# Patient Record
Sex: FEMALE | Race: WHITE | NOT HISPANIC OR LATINO | Employment: OTHER | URBAN - METROPOLITAN AREA
[De-identification: names, ages, dates, MRNs, and addresses within clinical notes are randomized per-mention and may not be internally consistent; named-entity substitution may affect disease eponyms.]

---

## 2018-01-08 ENCOUNTER — HISTORICAL (OUTPATIENT)
Dept: ADMINISTRATIVE | Facility: HOSPITAL | Age: 68
End: 2018-01-08

## 2018-01-31 ENCOUNTER — HISTORICAL (OUTPATIENT)
Dept: INTENSIVE CARE | Facility: HOSPITAL | Age: 68
End: 2018-01-31

## 2018-02-21 ENCOUNTER — HISTORICAL (OUTPATIENT)
Dept: RADIOLOGY | Facility: HOSPITAL | Age: 68
End: 2018-02-21

## 2018-02-22 ENCOUNTER — HISTORICAL (OUTPATIENT)
Dept: INTENSIVE CARE | Facility: HOSPITAL | Age: 68
End: 2018-02-22

## 2018-03-26 ENCOUNTER — HISTORICAL (OUTPATIENT)
Dept: ADMINISTRATIVE | Facility: HOSPITAL | Age: 68
End: 2018-03-26

## 2018-04-06 ENCOUNTER — HISTORICAL (OUTPATIENT)
Dept: CARDIOLOGY | Facility: HOSPITAL | Age: 68
End: 2018-04-06

## 2018-09-24 ENCOUNTER — HISTORICAL (OUTPATIENT)
Dept: ADMINISTRATIVE | Facility: HOSPITAL | Age: 68
End: 2018-09-24

## 2018-12-13 ENCOUNTER — HISTORICAL (OUTPATIENT)
Dept: RADIOLOGY | Facility: HOSPITAL | Age: 68
End: 2018-12-13

## 2018-12-13 LAB — POC CREATININE: 0.7 MG/DL (ref 0.6–1.3)

## 2019-03-04 ENCOUNTER — HISTORICAL (OUTPATIENT)
Dept: ADMINISTRATIVE | Facility: HOSPITAL | Age: 69
End: 2019-03-04

## 2019-04-10 ENCOUNTER — HISTORICAL (OUTPATIENT)
Dept: ENDOSCOPY | Facility: HOSPITAL | Age: 69
End: 2019-04-10

## 2020-01-06 ENCOUNTER — HISTORICAL (OUTPATIENT)
Dept: ADMINISTRATIVE | Facility: HOSPITAL | Age: 70
End: 2020-01-06

## 2020-01-14 ENCOUNTER — HISTORICAL (OUTPATIENT)
Dept: SURGERY | Facility: HOSPITAL | Age: 70
End: 2020-01-14

## 2020-02-12 ENCOUNTER — HISTORICAL (OUTPATIENT)
Dept: LAB | Facility: HOSPITAL | Age: 70
End: 2020-02-12

## 2020-09-30 ENCOUNTER — HISTORICAL (OUTPATIENT)
Dept: CARDIOLOGY | Facility: HOSPITAL | Age: 70
End: 2020-09-30

## 2022-02-21 ENCOUNTER — HISTORICAL (OUTPATIENT)
Dept: LAB | Facility: HOSPITAL | Age: 72
End: 2022-02-21

## 2022-02-21 LAB
ALBUMIN SERPL-MCNC: 4.2 G/DL (ref 3.4–4.8)
ALBUMIN/GLOB SERPL: 1.6 {RATIO} (ref 1.1–2)
ALP SERPL-CCNC: 73 U/L (ref 40–150)
ALT SERPL-CCNC: 21 U/L (ref 0–55)
AST SERPL-CCNC: 24 U/L (ref 5–34)
BILIRUB SERPL-MCNC: 1.3 MG/DL
BILIRUBIN DIRECT+TOT PNL SERPL-MCNC: 0.4 (ref 0–0.5)
BILIRUBIN DIRECT+TOT PNL SERPL-MCNC: 0.9 (ref 0–0.8)
BUN SERPL-MCNC: 19 MG/DL (ref 9.8–20.1)
CALCIUM SERPL-MCNC: 9.7 MG/DL (ref 8.7–10.5)
CHLORIDE SERPL-SCNC: 102 MMOL/L (ref 98–107)
CO2 SERPL-SCNC: 30 MMOL/L (ref 23–31)
CREAT SERPL-MCNC: 0.79 MG/DL (ref 0.55–1.02)
EST. AVERAGE GLUCOSE BLD GHB EST-MCNC: 111.2 MG/DL
GLOBULIN SER-MCNC: 2.6 G/DL (ref 2.4–3.5)
GLUCOSE SERPL-MCNC: 92 MG/DL (ref 82–115)
HBA1C MFR BLD: 5.5 %
HEMOLYSIS INTERF INDEX SERPL-ACNC: 33
ICTERIC INTERF INDEX SERPL-ACNC: 1
LIPEMIC INTERF INDEX SERPL-ACNC: 5
POTASSIUM SERPL-SCNC: 4.5 MMOL/L (ref 3.5–5.1)
PROT SERPL-MCNC: 6.8 G/DL (ref 5.8–7.6)
SODIUM SERPL-SCNC: 140 MMOL/L (ref 136–145)

## 2022-04-07 ENCOUNTER — HISTORICAL (OUTPATIENT)
Dept: ADMINISTRATIVE | Facility: HOSPITAL | Age: 72
End: 2022-04-07

## 2022-04-23 VITALS
WEIGHT: 197.56 LBS | DIASTOLIC BLOOD PRESSURE: 86 MMHG | HEIGHT: 62 IN | BODY MASS INDEX: 36.35 KG/M2 | SYSTOLIC BLOOD PRESSURE: 160 MMHG

## 2022-04-28 NOTE — H&P
HISTORY OF PRESENT ILLNESS:  Ms. Owsuu is a pleasant 68-year-old known to me from previous evaluation.  In 2014, colonoscopy found hemorrhoidal disease, a tortuous sigmoid with diverticula, and adenomatous polyps, prompting us to propose a repeat exam in 5 years' time.  She is today scheduled for that.     She did have some left upper quadrant and epigastric issues a couple of years back.  2016, upper examination found significant erosive antral gastritis.  She had been on nonsteroidals and aspirin.  She had a CT of the abdomen and pelvis then detailing steatosis, her post cholecystectomy and post hysterectomy state, a left parapelvic renal cyst, some atherosclerosis, diverticulosis, and degenerative joint disease.     She has done well in the interim, is maintained on omeprazole and rarely takes nonsteroidals.  She is on a daily aspirin because of a family history of cardiac concerns.  She does have osteopenia, treated with vitamin D and calcium.    PAST MEDICAL HISTORY:  Remarkable for the osteopenia, hypertension, left total knee replacement, cholecystectomy, abdominal hysterectomy, , appendectomy, blepharoplasty, tonsillectomy, exploratory laparoscopy, and breast reduction.    SOCIAL HISTORY:  She does not smoke, really does not drink.  She is  with a significant other.    FAMILY HISTORY:  She has a brother with ulcerative colitis.  No family history for colonic neoplasia.    PHYSICAL EXAMINATION:  VITAL SIGNS:  Finds her afebrile with stable vital signs.   HEART:  Regular in rate and rhythm.   LUNGS:  Clear to auscultation.   ABDOMEN:  Soft and nontender without organomegaly or mass.   NEUROLOGIC:  Alert and oriented.  Motor grossly intact.      DISCUSSION:  A 68-year-old scheduled for surveillance colonoscopy, with a personal history of adenomatous colon polyps.  The patient's orthopedist proposed that she ought to have penicillin 2 g 1 hour before today's procedure  and 1 g 6 hours after, and she has undertaken that.        ______________________________  Danilo Mcnair MD    JCB/UT  DD:  04/10/2019  Time:  08:46AM  DT:  04/10/2019  Time:  09:02AM  Job #:  [id number]    The H&P was reviewed, the patient was examined, and the following changes to the patients condition are noted:  ______________________________________________________________________________  ______________________________________________________________________________  ______________________________________________________________________________  [  ] No changes to the patient's condition:      ______________________________                                             ___________________  PHYSICIAN SIGNATURE                                                             DATE/TIME    cc: MD Harshal Carreno MD Stephen R. Salopek, MD

## 2022-04-28 NOTE — OP NOTE
DATE OF SURGERY:    04/10/2019    SURGEON:  Danilo Mcnair MD    PROCEDURE:  Colonoscopy, terminal ileoscopy, snare and biopsy polypectomy.    PREOPERATIVE DIAGNOSIS:  Personal history of adenomatous colon polyps.    POSTOPERATIVE DIAGNOSES:    1. The patient received antibiotic prophylaxis with penicillin V 500 mg 2 g before and 1 g post procedure.  2. Adequate sedation with Diprivan per Anesthesia.  3. Good colon prep, left-sided diverticulosis; otherwise, straightforward exam to the cecum and a normal-appearing terminal ileum.  4. 5 mm polyp at 20 cm in the sigmoid colon, snare, histopathology pending.  5. 4 mm polyp in the transverse colon, cold snare, histopathology pending (specimen may be lost).  6. 4 mm polyp at the hepatic flexure, cold snare, histopathology pending (specimen may be lost).  7. Two 3 mm polyps at the splenic flexure addressed with a cold forceps and submitted together.  8. Otherwise benign exam to the cecum.  Erythematous ileocecal valve, but normal-appearing terminal ileum.  Appendiceal orifice noted.    PROCEDURE IN DETAIL:  The patient consented for the procedure after a detailed explanation of the risks and complications including bleeding, perforation and adverse reaction to sedation.  The patient was placed in the left lateral decubitus position.  Initially we examined the perineum and performed a digital exam.     The video colonoscope was inserted in the rectal vault and passed under direct vision.  We retroflexed to allow visualization of the anal verge and then de-retroflexed, traversing the sigmoid colon, descending colon and splenic flexure, transverse colon, hepatic flexure, ascending colon and entering the cecum.  The cecum was identified by visualizing the appendiceal orifice and the ileocecal valve.  The scope was withdrawn with reexamination of the mucosa and with biopsies, polypectomies and specimens removed as outlined below.     The scope was removed in its  entirety and the patient tolerated the procedure well.     The patient's findings are as detailed.  She did have some left-sided diverticulosis.  She had polyps noted and addressed as detailed, 1 at 20 cm in the sigmoid colon, another in the transverse colon, 1 at the hepatic flexure, and 2 small polyps at the splenic flexure.  We did see through to the cecum.  We saw an erythematous ileocecal valve and a normal-appearing terminal ileum.  Slow withdrawal found no other discrete mucosal abnormalities.    DISCUSSION AND DISCHARGE SUMMARY:  After procedure was completed, we discussed our findings with the patient and her attendant.  We examined her and found her stable for discharge.  We allowed that she could resume usual diet today and activities tomorrow.  She is to call if she has not heard about her biopsies within the week.  Given the number of polyps noted at today's exam, current recommendations would suggest a repeat examination in 3 years' time.  She should have her siblings screened.        ______________________________  MD KARTHIKEYAN Valentin/SH  DD:  04/10/2019  Time:  09:16AM  DT:  04/10/2019  Time:  09:32AM  Job #:  [id number]    cc: MD Harshal Carreno MD James C. Bienvenu, MD Stephen R. Salopek, MD

## 2022-04-28 NOTE — OP NOTE
DATE OF SURGERY:    01/14/2020    SURGEON:  Harshal Reyes MD    PREOPERATIVE DIAGNOSIS:  Right middle finger trigger finger.    POSTOPERATIVE DIAGNOSIS:  Right middle finger trigger finger.    PROCEDURE:  Trigger finger release of right middle finger.    COUNTS:  Lap, needle, and sponge counts correct.    COMPLICATIONS:  None.    ESTIMATED BLOOD LOSS:  None.    INDICATIONS FOR PROCEDURE:  Ms. Owusu is 69 years old with a trigger finger of her right middle finger at the A-1 pulley.  She elected to undergo trigger finger release of the right middle finger.  Risks, benefits, alternatives, and complications of operative and nonoperative treatment were explained.  She understood, agreed, and wanted to proceed with operative intervention.  Valid consent was obtained.    DESCRIPTION OF PROCEDURE:  She was brought to the operating room, placed supine on the operating room table and underwent anesthesia.  The tourniquet was placed on the right arm.  The usual sterile ChloraPrep scrub and paint followed by sterile draping was performed.  Ioban dressing was placed.  Esmarch bandage was used to exsanguinate the right upper extremity.  The tourniquet was inflated.  Incision was made in the distal palmar crease at the base of the A-1 pulley of the right middle finger on the palmar surface.  Skin bleeders were coagulated with cautery.  The skin was lifted.  Digital nerve was protected and the A-1 pulley at the flexor tendon sheath was incised to release the triggering.  There was no further triggering.  The tourniquet was deflated.  Hemostasis was obtained.  There was no abnormal bleeding.  The wound was irrigated, suctioned, and closed with nylon sutures.  Sterile dressings were applied.  The patient was taken to the recovery room in stable condition.      ______________________________  Harshal Reyes MD    SY/UE  DD:  01/14/2020  Time:  08:20AM  DT:  01/14/2020  Time:  08:26AM  Job #:  [id number]

## 2023-03-23 ENCOUNTER — HOSPITAL ENCOUNTER (OUTPATIENT)
Dept: RADIOLOGY | Facility: CLINIC | Age: 73
Discharge: HOME | End: 2023-03-23
Attending: PHYSICIAN ASSISTANT
Payer: MEDICARE

## 2023-03-23 ENCOUNTER — OFFICE VISIT (OUTPATIENT)
Dept: ORTHOPEDICS | Facility: CLINIC | Age: 73
End: 2023-03-23
Payer: MEDICARE

## 2023-03-23 VITALS — WEIGHT: 196.69 LBS | HEIGHT: 62 IN | BODY MASS INDEX: 36.2 KG/M2

## 2023-03-23 DIAGNOSIS — M79.641 RIGHT HAND PAIN: ICD-10-CM

## 2023-03-23 DIAGNOSIS — M17.11 PRIMARY OSTEOARTHRITIS OF RIGHT KNEE: ICD-10-CM

## 2023-03-23 DIAGNOSIS — M25.561 PAIN IN BOTH KNEES, UNSPECIFIED CHRONICITY: ICD-10-CM

## 2023-03-23 DIAGNOSIS — M25.562 PAIN IN BOTH KNEES, UNSPECIFIED CHRONICITY: Primary | ICD-10-CM

## 2023-03-23 DIAGNOSIS — M25.562 PAIN IN BOTH KNEES, UNSPECIFIED CHRONICITY: ICD-10-CM

## 2023-03-23 DIAGNOSIS — M25.561 PAIN IN BOTH KNEES, UNSPECIFIED CHRONICITY: Primary | ICD-10-CM

## 2023-03-23 DIAGNOSIS — M19.041 ARTHRITIS OF FINGER OF RIGHT HAND: ICD-10-CM

## 2023-03-23 PROCEDURE — 73564 X-RAY EXAM KNEE 4 OR MORE: CPT | Mod: 50,,, | Performed by: PHYSICIAN ASSISTANT

## 2023-03-23 PROCEDURE — 99203 OFFICE O/P NEW LOW 30 MIN: CPT | Mod: ,,, | Performed by: PHYSICIAN ASSISTANT

## 2023-03-23 PROCEDURE — 73564 XR KNEE COMP 4 OR MORE VIEWS BILAT: ICD-10-PCS | Mod: 50,,, | Performed by: PHYSICIAN ASSISTANT

## 2023-03-23 PROCEDURE — 73130 XR HAND COMPLETE 3 VIEW RIGHT: ICD-10-PCS | Mod: RT,,, | Performed by: PHYSICIAN ASSISTANT

## 2023-03-23 PROCEDURE — 73130 X-RAY EXAM OF HAND: CPT | Mod: RT,,, | Performed by: PHYSICIAN ASSISTANT

## 2023-03-23 PROCEDURE — 99203 PR OFFICE/OUTPT VISIT, NEW, LEVL III, 30-44 MIN: ICD-10-PCS | Mod: ,,, | Performed by: PHYSICIAN ASSISTANT

## 2023-03-23 RX ORDER — PAROXETINE HYDROCHLORIDE 20 MG/1
20 TABLET, FILM COATED ORAL EVERY MORNING
COMMUNITY

## 2023-03-23 RX ORDER — ROSUVASTATIN CALCIUM 20 MG/1
20 TABLET, COATED ORAL
COMMUNITY
Start: 2023-02-24

## 2023-03-23 RX ORDER — VALSARTAN AND HYDROCHLOROTHIAZIDE 160; 25 MG/1; MG/1
1 TABLET ORAL
COMMUNITY
Start: 2023-02-24

## 2023-03-23 RX ORDER — FAMOTIDINE 40 MG/1
40 TABLET, FILM COATED ORAL DAILY
COMMUNITY

## 2023-03-23 RX ORDER — METOPROLOL SUCCINATE 50 MG/1
50 TABLET, EXTENDED RELEASE ORAL
COMMUNITY
Start: 2023-02-24

## 2023-03-23 RX ORDER — OXCARBAZEPINE 150 MG/1
150 TABLET, FILM COATED ORAL 2 TIMES DAILY
COMMUNITY

## 2023-03-24 NOTE — PROGRESS NOTES
"Chief Complaint:   Chief Complaint   Patient presents with    Knee Pain     ramiro knee pain. lt tka 2018 states has been hurting. achy on/off. rt knee recently started to hurt. states they have been swelling. has been taken aleve to help decrease pain.        History of present illness:    This is a 72 y.o. year old female who complains of bilateral knee pain right greater than left.    Patient has had a previous left knee replacement and pain is not really the issue she states it is more of the fact that she does have some limited range of motion and she can not kneel on her knees for any pain length of time.      Right knee does give her some pain on the medial aspect of her knee but no acute injuries been recalled.      She is also complaining of some finger pain in the right hand with stiffness of the finger joints    Review of Systems:    Constitution:   Denies chills, fever, and sweats.  HENT:   Denies headaches or blurry vision.  Cardiovascular:  Denies chest pain or irregular heart beat.  Respiratory:   Denies cough or shortness of breath.  Gastrointestinal:  Denies abdominal pain, nausea, or vomiting.  Musculoskeletal:   Denies muscle cramps.  Neurological:   Denies dizziness or focal weakness.  Psychiatric/Behavior: Normal mental status.  Hematology/Lymph:  Denies bleeding problem or easy bruising/bleeding.  Skin:    Denies rash or suspicious lesions.    Examination:    Vital Signs:    Vitals:    03/23/23 0754   Weight: 89.2 kg (196 lb 11.2 oz)   Height: 5' 2" (1.575 m)       Body mass index is 35.98 kg/m².    Constitution:   Well-developed, well nourished patient in no acute distress.  Neurological:   Alert and oriented x 3 and cooperative to examination.     Psychiatric/Behavior: Normal mental status.  Respiratory:   No shortness of breath.  Eyes:    Extraoccular muscles intact  Skin:    No scars, rash or suspicious lesions.    Physical Exam:       General Musculoskeletal Exam   Gait: antalgic       Right " Knee Exam     Inspection   Erythema: absent  Effusion: present  Deformity: present  Bruising: absent    Tenderness   The patient is tender to palpation of the medial joint line    Crepitus   The patient has crepitus with ROM    Range of Motion   Extension: abnormal   Flexion: abnormal   5-125    Tests   Meniscus   Papito:  Negative  Ligament Examination   MCL - Valgus: normal (0 to 2mm)  LCL - Varus: normal  Patella   Passive Patellar Tilt: neutral    Other   Sensation: normal    Comments:  Varus deformity    Muscle Strength   Right Lower Extremity   Quadriceps:  5/5   Hamstrin/5     Vascular Exam     Right Pulses  Dorsalis Pedis:      2+  Posterior Tibial:      2+      Left knee exam   Patient has range of motion from 0-100 degrees   She has good stability with medial and lateral stressing   No AP instability   Well-healed surgical scar   No erythema or effusion noted      Imaging: X-rays ordered and images interpreted today personally by me of bilateral knees four views each knee  Patient's right knee has very near to bone-on-bone medial compartment.    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   No acute osseous abnormalities        Assessment: Pain in both knees, unspecified chronicity  -     X-Ray Knee Complete 4 Or More Views Bilat; Future; Expected date: 2023    Right hand pain  -     X-Ray Hand 3 view Right;  Future; Expected date: 03/23/2023         Plan:  This point the patient states her right knee pain is not bad enough for injection  She will use a prescription anti-inflammatory which will help her knee and her finger arthritis     I do not feel that a course of physical therapy at this point for her left knee we will get her any increase in range of motion and that her range of motion is where it is probably going to be.      Patient will call the office in the future she is in town is having knee pain for a knee injection          DISCLAIMER: This note may have been dictated using voice recognition software and may contain grammatical errors.     NOTE: Consult report sent to referring provider via [employer] EMR.